# Patient Record
Sex: MALE | Race: BLACK OR AFRICAN AMERICAN | NOT HISPANIC OR LATINO | Employment: OTHER | URBAN - METROPOLITAN AREA
[De-identification: names, ages, dates, MRNs, and addresses within clinical notes are randomized per-mention and may not be internally consistent; named-entity substitution may affect disease eponyms.]

---

## 2021-10-12 LAB
RAPID GROUP A STREP (OHS): POSITIVE
SARS-COV-2 RNA RESP QL NAA+PROBE: NEGATIVE

## 2021-11-20 LAB — RAPID GROUP A STREP (OHS): NEGATIVE

## 2022-04-07 ENCOUNTER — HISTORICAL (OUTPATIENT)
Dept: ADMINISTRATIVE | Facility: HOSPITAL | Age: 42
End: 2022-04-07

## 2022-04-24 VITALS
HEIGHT: 72 IN | OXYGEN SATURATION: 100 % | DIASTOLIC BLOOD PRESSURE: 119 MMHG | WEIGHT: 229.25 LBS | BODY MASS INDEX: 31.05 KG/M2 | SYSTOLIC BLOOD PRESSURE: 175 MMHG

## 2022-09-08 ENCOUNTER — OFFICE VISIT (OUTPATIENT)
Dept: URGENT CARE | Facility: CLINIC | Age: 42
End: 2022-09-08
Payer: COMMERCIAL

## 2022-09-08 VITALS
DIASTOLIC BLOOD PRESSURE: 106 MMHG | SYSTOLIC BLOOD PRESSURE: 185 MMHG | BODY MASS INDEX: 32.21 KG/M2 | TEMPERATURE: 99 F | OXYGEN SATURATION: 100 % | RESPIRATION RATE: 18 BRPM | HEIGHT: 70 IN | HEART RATE: 101 BPM | WEIGHT: 225 LBS

## 2022-09-08 DIAGNOSIS — J02.9 ACUTE PHARYNGITIS, UNSPECIFIED ETIOLOGY: ICD-10-CM

## 2022-09-08 DIAGNOSIS — J02.9 SORE THROAT: Primary | ICD-10-CM

## 2022-09-08 LAB
CTP QC/QA: YES
S PYO RRNA THROAT QL PROBE: NEGATIVE

## 2022-09-08 PROCEDURE — 99213 OFFICE O/P EST LOW 20 MIN: CPT | Mod: 25,,, | Performed by: PHYSICIAN ASSISTANT

## 2022-09-08 PROCEDURE — 87880 POCT RAPID STREP A: ICD-10-PCS | Mod: QW,,, | Performed by: PHYSICIAN ASSISTANT

## 2022-09-08 PROCEDURE — 87880 STREP A ASSAY W/OPTIC: CPT | Mod: QW,,, | Performed by: PHYSICIAN ASSISTANT

## 2022-09-08 PROCEDURE — 99213 PR OFFICE/OUTPT VISIT, EST, LEVL III, 20-29 MIN: ICD-10-PCS | Mod: 25,,, | Performed by: PHYSICIAN ASSISTANT

## 2022-09-08 RX ORDER — FERROUS SULFATE 325(65) MG
325 TABLET ORAL
COMMUNITY
Start: 2021-10-12

## 2022-09-08 RX ORDER — ALLOPURINOL 300 MG/1
300 TABLET ORAL DAILY PRN
COMMUNITY
Start: 2022-04-10

## 2022-09-08 RX ORDER — AMOXICILLIN 875 MG/1
875 TABLET, FILM COATED ORAL 2 TIMES DAILY
Qty: 20 TABLET | Refills: 0 | Status: SHIPPED | OUTPATIENT
Start: 2022-09-08 | End: 2022-09-18

## 2022-09-08 RX ORDER — MELOXICAM 15 MG/1
15 TABLET ORAL DAILY
COMMUNITY
Start: 2022-07-29

## 2022-09-08 RX ORDER — ROSUVASTATIN CALCIUM 20 MG/1
20 TABLET, COATED ORAL
COMMUNITY
Start: 2021-10-12

## 2022-09-08 RX ORDER — EZETIMIBE 10 MG/1
10 TABLET ORAL
COMMUNITY
Start: 2021-10-12

## 2022-09-08 RX ORDER — TADALAFIL 20 MG/1
20 TABLET ORAL DAILY PRN
COMMUNITY
Start: 2022-08-28

## 2022-09-08 NOTE — PROGRESS NOTES
"Subjective:       Patient ID: Ignacio Bloom III is a 41 y.o. male.    Vitals:  height is 5' 10" (1.778 m) and weight is 102.1 kg (225 lb). His oral temperature is 98.6 °F (37 °C). His blood pressure is 185/106 (abnormal) and his pulse is 101. His respiration is 18 and oxygen saturation is 100%.     Chief Complaint: Sore Throat    Sore throat x2-3 days. Congestion x2 weeks.  Denies any fever neck stiffness rash cough shortness of breath GI symptoms.  ROS    Objective:      Physical Exam   Constitutional: He is oriented to person, place, and time. He appears well-developed. He is cooperative.  Non-toxic appearance. He does not appear ill. No distress.   HENT:   Head: Normocephalic and atraumatic.   Ears:   Right Ear: Hearing, tympanic membrane, external ear and ear canal normal.   Left Ear: Hearing, tympanic membrane, external ear and ear canal normal.   Nose: Nose normal. No mucosal edema, rhinorrhea or nasal deformity. No epistaxis. Right sinus exhibits no maxillary sinus tenderness and no frontal sinus tenderness. Left sinus exhibits no maxillary sinus tenderness and no frontal sinus tenderness.   Mouth/Throat: Uvula is midline and mucous membranes are normal. No trismus in the jaw. Normal dentition. No uvula swelling. Posterior oropharyngeal erythema present. No oropharyngeal exudate or posterior oropharyngeal edema.   Eyes: Conjunctivae and lids are normal. No scleral icterus.   Neck: Trachea normal and phonation normal. Neck supple. No edema present. No erythema present. No neck rigidity present.   Cardiovascular: Normal rate, regular rhythm, normal heart sounds and normal pulses.   Pulmonary/Chest: Effort normal and breath sounds normal. No respiratory distress. He has no decreased breath sounds. He has no rhonchi.   Abdominal: Normal appearance.   Musculoskeletal: Normal range of motion.         General: No deformity. Normal range of motion.   Lymphadenopathy:     He has no cervical adenopathy.   Neurological: " "He is alert and oriented to person, place, and time. He exhibits normal muscle tone. Coordination normal.   Skin: Skin is warm, dry, intact, not diaphoretic and not pale.   Psychiatric: His speech is normal and behavior is normal. Judgment and thought content normal.   Nursing note and vitals reviewed.             Previous History      Review of patient's allergies indicates:  No Known Allergies    Past Medical History:   Diagnosis Date    Anemia     Hyperlipidemia      Current Outpatient Medications   Medication Instructions    allopurinoL (ZYLOPRIM) 300 mg, Oral, Daily PRN    amoxicillin (AMOXIL) 875 mg, Oral, 2 times daily    ezetimibe (ZETIA) 10 mg, Oral    ferrous sulfate (FEOSOL) 325 mg, Oral    meloxicam (MOBIC) 15 mg, Oral, Daily    rosuvastatin (CRESTOR) 20 mg, Oral    tadalafiL (CIALIS) 20 mg, Oral, Daily PRN     History reviewed. No pertinent surgical history.  Family History   Problem Relation Age of Onset    No Known Problems Mother     Heart attack Father        Social History     Tobacco Use    Smoking status: Never     Passive exposure: Never    Smokeless tobacco: Never   Substance Use Topics    Alcohol use: Yes    Drug use: Never        Physical Exam      Vital Signs Reviewed   BP (!) 185/106   Pulse 101   Temp 98.6 °F (37 °C) (Oral)   Resp 18   Ht 5' 10" (1.778 m)   Wt 102.1 kg (225 lb)   SpO2 100%   BMI 32.28 kg/m²        Procedures    Procedures     Labs     Results for orders placed or performed in visit on 09/08/22   POCT rapid strep A   Result Value Ref Range    Rapid Strep A Screen Negative Negative     Acceptable Yes      Assessment:       1. Sore throat    2. Acute pharyngitis, unspecified etiology          Plan:         Sore throat  -     POCT rapid strep A    Acute pharyngitis, unspecified etiology  -     amoxicillin (AMOXIL) 875 MG tablet; Take 1 tablet (875 mg total) by mouth 2 (two) times daily. for 10 days  Dispense: 20 tablet; Refill: 0       Drink plenty of " fluids    Get plenty of rest.    Follow-up with your primary care doctor      Go to emergency department with any significant change or worsening symptoms.    Tylenol or Motrin as needed for fever.  Please monitor your blood pressure and follow-up with your regular doctor.

## 2022-09-08 NOTE — PATIENT INSTRUCTIONS
Drink plenty of fluids    Get plenty of rest.    Follow-up with your primary care doctor      Go to emergency department with any significant change or worsening symptoms.    Tylenol or Motrin as needed for fever.  Please monitor your blood pressure and follow-up with your regular doctor.

## 2022-09-15 ENCOUNTER — HISTORICAL (OUTPATIENT)
Dept: ADMINISTRATIVE | Facility: HOSPITAL | Age: 42
End: 2022-09-15
Payer: COMMERCIAL

## 2023-07-06 ENCOUNTER — OFFICE VISIT (OUTPATIENT)
Dept: URGENT CARE | Facility: CLINIC | Age: 43
End: 2023-07-06
Payer: COMMERCIAL

## 2023-07-06 VITALS
WEIGHT: 225 LBS | DIASTOLIC BLOOD PRESSURE: 99 MMHG | SYSTOLIC BLOOD PRESSURE: 150 MMHG | RESPIRATION RATE: 18 BRPM | OXYGEN SATURATION: 100 % | BODY MASS INDEX: 32.21 KG/M2 | TEMPERATURE: 99 F | HEART RATE: 106 BPM | HEIGHT: 70 IN

## 2023-07-06 DIAGNOSIS — M10.9 GOUT, UNSPECIFIED CAUSE, UNSPECIFIED CHRONICITY, UNSPECIFIED SITE: Primary | ICD-10-CM

## 2023-07-06 PROCEDURE — 96372 PR INJECTION,THERAP/PROPH/DIAG2ST, IM OR SUBCUT: ICD-10-PCS | Mod: ,,, | Performed by: FAMILY MEDICINE

## 2023-07-06 PROCEDURE — 96372 THER/PROPH/DIAG INJ SC/IM: CPT | Mod: ,,, | Performed by: FAMILY MEDICINE

## 2023-07-06 PROCEDURE — 99213 OFFICE O/P EST LOW 20 MIN: CPT | Mod: 25,,, | Performed by: FAMILY MEDICINE

## 2023-07-06 PROCEDURE — 99213 PR OFFICE/OUTPT VISIT, EST, LEVL III, 20-29 MIN: ICD-10-PCS | Mod: 25,,, | Performed by: FAMILY MEDICINE

## 2023-07-06 RX ORDER — HYDROCODONE BITARTRATE AND ACETAMINOPHEN 5; 325 MG/1; MG/1
1 TABLET ORAL EVERY 6 HOURS PRN
Qty: 16 TABLET | Refills: 0 | Status: SHIPPED | OUTPATIENT
Start: 2023-07-06 | End: 2023-07-10

## 2023-07-06 RX ORDER — PREDNISONE 20 MG/1
TABLET ORAL
Qty: 15 TABLET | Refills: 0 | Status: SHIPPED | OUTPATIENT
Start: 2023-07-06

## 2023-07-06 RX ORDER — DEXAMETHASONE SODIUM PHOSPHATE 100 MG/10ML
10 INJECTION INTRAMUSCULAR; INTRAVENOUS
Status: COMPLETED | OUTPATIENT
Start: 2023-07-06 | End: 2023-07-06

## 2023-07-06 RX ORDER — KETOROLAC TROMETHAMINE 30 MG/ML
30 INJECTION, SOLUTION INTRAMUSCULAR; INTRAVENOUS
Status: COMPLETED | OUTPATIENT
Start: 2023-07-06 | End: 2023-07-06

## 2023-07-06 RX ADMIN — DEXAMETHASONE SODIUM PHOSPHATE 10 MG: 100 INJECTION INTRAMUSCULAR; INTRAVENOUS at 09:07

## 2023-07-06 RX ADMIN — KETOROLAC TROMETHAMINE 30 MG: 30 INJECTION, SOLUTION INTRAMUSCULAR; INTRAVENOUS at 09:07

## 2023-07-06 NOTE — PROGRESS NOTES
"Subjective:      Patient ID: Ignacio Bloom III is a 42 y.o. male.    Vitals:  height is 5' 10" (1.778 m) and weight is 102.1 kg (225 lb). His temperature is 98.5 °F (36.9 °C). His blood pressure is 150/99 (abnormal) and his pulse is 106. His respiration is 18 and oxygen saturation is 100%.     Chief Complaint: Gout (Gout left foot x4d no medication taken /Denies any past injury )     Patient is a 42 y.o. male who presents to urgent care with complaints of Gout left foot, great toe x4 days. No medication taken. Patient denies any  injury.  Patient has a history of gout.  States he did have alcohol and some red meat prior to this flare-up.      Constitution: Negative.   HENT: Negative.     Neck: neck negative.   Cardiovascular: Negative.    Eyes: Negative.    Respiratory: Negative.     Gastrointestinal: Negative.    Genitourinary: Negative.    Musculoskeletal:  Positive for joint pain.   Skin: Negative.    Allergic/Immunologic: Negative.    Neurological: Negative.    Hematologic/Lymphatic: Negative.     Objective:     Physical Exam   Constitutional: He is oriented to person, place, and time.  Non-toxic appearance. He does not appear ill. No distress.   HENT:   Head: Normocephalic and atraumatic.   Eyes: Conjunctivae are normal.   Pulmonary/Chest: Effort normal.   Abdominal: Normal appearance.   Musculoskeletal:         General: Tenderness (left great toe) present.   Neurological: He is alert and oriented to person, place, and time.   Skin: Skin is not diaphoretic.   Psychiatric: His behavior is normal. Mood, judgment and thought content normal.   Vitals reviewed.       Previous History      Review of patient's allergies indicates:  No Known Allergies    Past Medical History:   Diagnosis Date    Anemia     Hyperlipidemia      Current Outpatient Medications   Medication Instructions    allopurinoL (ZYLOPRIM) 300 mg, Oral, Daily PRN    ezetimibe (ZETIA) 10 mg, Oral    ferrous sulfate (FEOSOL) 325 mg, Oral    " "HYDROcodone-acetaminophen (NORCO) 5-325 mg per tablet 1 tablet, Oral, Every 6 hours PRN    meloxicam (MOBIC) 15 mg, Oral, Daily    predniSONE (DELTASONE) 20 MG tablet Take 2 tabs p.o. q.d. x5 days then 1 tab p.o. q.d. x5 days    rosuvastatin (CRESTOR) 20 mg, Oral    tadalafiL (CIALIS) 20 mg, Oral, Daily PRN     No past surgical history on file.  Family History   Problem Relation Age of Onset    No Known Problems Mother     Heart attack Father        Social History     Tobacco Use    Smoking status: Never     Passive exposure: Never    Smokeless tobacco: Never   Substance Use Topics    Alcohol use: Yes    Drug use: Never        Physical Exam      Vital Signs Reviewed   BP (!) 150/99   Pulse 106   Temp 98.5 °F (36.9 °C)   Resp 18   Ht 5' 10" (1.778 m)   Wt 102.1 kg (225 lb)   SpO2 100%   BMI 32.28 kg/m²        Procedures    Procedures     Labs     Results for orders placed or performed in visit on 09/15/22   POCT rapid strep A   Result Value Ref Range    Rapid Group A Strep Negative        Assessment:     1. Gout, unspecified cause, unspecified chronicity, unspecified site        Plan:   Medications sent to pharmacy  Start the oral steroids tomorrow morning.  Continue steroids until the pain has resolved then you can stop taking the steroids.  Pain medication may cause drowsiness.  Do not drink alcohol or drive when taking it.  Avoid your triggers such as alcohol and red meat.  Contact this clinic with any concerns  Gout, unspecified cause, unspecified chronicity, unspecified site    Other orders  -     dexAMETHasone injection 10 mg  -     ketorolac injection 30 mg  -     predniSONE (DELTASONE) 20 MG tablet; Take 2 tabs p.o. q.d. x5 days then 1 tab p.o. q.d. x5 days  Dispense: 15 tablet; Refill: 0  -     HYDROcodone-acetaminophen (NORCO) 5-325 mg per tablet; Take 1 tablet by mouth every 6 (six) hours as needed for Pain.  Dispense: 16 tablet; Refill: 0                    "

## 2023-07-06 NOTE — PATIENT INSTRUCTIONS
Medications sent to pharmacy  Start the oral steroids tomorrow morning.  Continue steroids until the pain has resolved then you can stop taking the steroids.  Pain medication may cause drowsiness.  Do not drink alcohol or drive when taking it.  Avoid your triggers such as alcohol and red meat.  Contact this clinic with any concerns

## 2023-11-24 ENCOUNTER — TELEPHONE (OUTPATIENT)
Dept: URGENT CARE | Facility: CLINIC | Age: 43
End: 2023-11-24

## 2023-11-24 ENCOUNTER — OFFICE VISIT (OUTPATIENT)
Dept: URGENT CARE | Facility: CLINIC | Age: 43
End: 2023-11-24
Payer: COMMERCIAL

## 2023-11-24 VITALS
HEART RATE: 90 BPM | BODY MASS INDEX: 30.8 KG/M2 | RESPIRATION RATE: 20 BRPM | WEIGHT: 220 LBS | HEIGHT: 71 IN | OXYGEN SATURATION: 99 % | SYSTOLIC BLOOD PRESSURE: 158 MMHG | DIASTOLIC BLOOD PRESSURE: 90 MMHG | TEMPERATURE: 99 F

## 2023-11-24 DIAGNOSIS — J01.90 ACUTE SINUSITIS, RECURRENCE NOT SPECIFIED, UNSPECIFIED LOCATION: ICD-10-CM

## 2023-11-24 DIAGNOSIS — R05.9 COUGH, UNSPECIFIED TYPE: Primary | ICD-10-CM

## 2023-11-24 PROCEDURE — 99213 PR OFFICE/OUTPT VISIT, EST, LEVL III, 20-29 MIN: ICD-10-PCS | Mod: ,,, | Performed by: PHYSICIAN ASSISTANT

## 2023-11-24 PROCEDURE — 99213 OFFICE O/P EST LOW 20 MIN: CPT | Mod: ,,, | Performed by: PHYSICIAN ASSISTANT

## 2023-11-24 RX ORDER — PREDNISONE 20 MG/1
20 TABLET ORAL 2 TIMES DAILY
Qty: 10 TABLET | Refills: 0 | Status: SHIPPED | OUTPATIENT
Start: 2023-11-24 | End: 2023-11-29

## 2023-11-24 RX ORDER — AMOXICILLIN AND CLAVULANATE POTASSIUM 875; 125 MG/1; MG/1
1 TABLET, FILM COATED ORAL EVERY 12 HOURS
Qty: 20 TABLET | Refills: 0 | Status: SHIPPED | OUTPATIENT
Start: 2023-11-24 | End: 2023-12-04

## 2023-11-24 NOTE — PROGRESS NOTES
"Subjective:      Patient ID: Ignacoi Bloom III is a 43 y.o. male.    Vitals:  height is 5' 11" (1.803 m) and weight is 99.8 kg (220 lb). His oral temperature is 99.1 °F (37.3 °C). His blood pressure is 158/90 (abnormal) and his pulse is 90. His respiration is 20 and oxygen saturation is 99%.     Chief Complaint: Epistaxis (Pt reports to clinic with c/o of nose bleed and coughing up blood for about a week. He states he been having wheezing issues. )    The patient is a 43-year-old male complains of a 1-2 week history of nasal congestion, productive cough, occasional epistaxis, and occasional bloody productive cough.  Denies fever or shortness of breath.  He does report using a vape frequently.      ROS   Objective:     Physical Exam   Constitutional: He is oriented to person, place, and time. He appears well-developed. He is cooperative.  Non-toxic appearance. He does not appear ill. No distress.   HENT:   Head: Normocephalic and atraumatic.   Ears:   Right Ear: Hearing, tympanic membrane, external ear and ear canal normal.   Left Ear: Hearing, tympanic membrane, external ear and ear canal normal.   Nose: No nasal deformity. No epistaxis.   Mouth/Throat: Uvula is midline, oropharynx is clear and moist and mucous membranes are normal. No trismus in the jaw. Normal dentition. No uvula swelling. No oropharyngeal exudate, posterior oropharyngeal edema or posterior oropharyngeal erythema.   Eyes: Conjunctivae and lids are normal. No scleral icterus.   Neck: Trachea normal and phonation normal. Neck supple. No edema present. No erythema present. No neck rigidity present.   Cardiovascular: Normal rate, regular rhythm, normal heart sounds and normal pulses.   Pulmonary/Chest: Effort normal and breath sounds normal. No respiratory distress. He has no decreased breath sounds. He has no rhonchi.   Abdominal: Normal appearance.   Musculoskeletal: Normal range of motion.         General: No deformity. Normal range of motion. "   Neurological: He is alert and oriented to person, place, and time. He exhibits normal muscle tone. Coordination normal.   Skin: Skin is warm, dry, intact, not diaphoretic and not pale.   Psychiatric: His speech is normal and behavior is normal. Judgment and thought content normal.   Nursing note and vitals reviewed.  Dried blood present in both nostrils.  Mucous membranes are mildly swollen and friable.      Chest XR: no observed acute findings. Awaiting radiology over read.      Assessment:     1. Cough, unspecified type    2. Acute sinusitis, recurrence not specified, unspecified location        Plan:       Cough, unspecified type  -     X-Ray Chest PA And Lateral; Future; Expected date: 11/24/2023    Acute sinusitis, recurrence not specified, unspecified location    Other orders  -     predniSONE (DELTASONE) 20 MG tablet; Take 1 tablet (20 mg total) by mouth 2 (two) times daily. for 5 days  Dispense: 10 tablet; Refill: 0  -     amoxicillin-clavulanate 875-125mg (AUGMENTIN) 875-125 mg per tablet; Take 1 tablet by mouth every 12 (twelve) hours. for 10 days  Dispense: 20 tablet; Refill: 0      Drink plenty of fluids.     Get plenty of rest.     Tylenol or Motrin as needed.     Go to the ER with any significant change or worsening of symptoms.     Follow up with your primary care doctor.

## 2024-04-24 ENCOUNTER — OFFICE VISIT (OUTPATIENT)
Dept: URGENT CARE | Facility: CLINIC | Age: 44
End: 2024-04-24
Payer: COMMERCIAL

## 2024-04-24 VITALS
BODY MASS INDEX: 28 KG/M2 | HEIGHT: 71 IN | OXYGEN SATURATION: 98 % | SYSTOLIC BLOOD PRESSURE: 143 MMHG | TEMPERATURE: 99 F | WEIGHT: 200 LBS | HEART RATE: 98 BPM | DIASTOLIC BLOOD PRESSURE: 103 MMHG | RESPIRATION RATE: 17 BRPM

## 2024-04-24 DIAGNOSIS — R19.7 DIARRHEA, UNSPECIFIED TYPE: ICD-10-CM

## 2024-04-24 DIAGNOSIS — K21.9 GASTROESOPHAGEAL REFLUX DISEASE, UNSPECIFIED WHETHER ESOPHAGITIS PRESENT: Primary | ICD-10-CM

## 2024-04-24 PROCEDURE — 99213 OFFICE O/P EST LOW 20 MIN: CPT | Mod: ,,,

## 2024-04-24 RX ORDER — PANTOPRAZOLE SODIUM 40 MG/1
40 TABLET, DELAYED RELEASE ORAL DAILY
Qty: 90 TABLET | Refills: 3 | Status: SHIPPED | OUTPATIENT
Start: 2024-04-24 | End: 2025-04-24

## 2024-04-24 RX ORDER — ALUMINUM HYDROXIDE, MAGNESIUM HYDROXIDE, AND SIMETHICONE 1200; 120; 1200 MG/30ML; MG/30ML; MG/30ML
30 SUSPENSION ORAL
Status: DISCONTINUED | OUTPATIENT
Start: 2024-04-24 | End: 2024-04-24

## 2024-04-24 RX ORDER — ALUMINUM HYDROXIDE, MAGNESIUM HYDROXIDE, AND SIMETHICONE 1200; 120; 1200 MG/30ML; MG/30ML; MG/30ML
30 SUSPENSION ORAL
Status: COMPLETED | OUTPATIENT
Start: 2024-04-24 | End: 2024-04-24

## 2024-04-24 RX ORDER — LIDOCAINE HYDROCHLORIDE 20 MG/ML
15 SOLUTION OROPHARYNGEAL
Status: DISCONTINUED | OUTPATIENT
Start: 2024-04-24 | End: 2024-04-24

## 2024-04-24 RX ORDER — LIDOCAINE HYDROCHLORIDE 20 MG/ML
15 SOLUTION OROPHARYNGEAL
Status: COMPLETED | OUTPATIENT
Start: 2024-04-24 | End: 2024-04-24

## 2024-04-24 RX ADMIN — ALUMINUM HYDROXIDE, MAGNESIUM HYDROXIDE, AND SIMETHICONE 30 ML: 1200; 120; 1200 SUSPENSION ORAL at 11:04

## 2024-04-24 RX ADMIN — LIDOCAINE HYDROCHLORIDE 15 ML: 20 SOLUTION OROPHARYNGEAL at 11:04

## 2024-04-24 NOTE — PATIENT INSTRUCTIONS
Suspect that you may have developed a stomach virus. Over-the-counter Imodium as needed for diarrhea.  Drink plenty of fluids and rest.     Highly likely that you also have acid reflux.  We will start you on acid reflux medication.  Remember to avoid foods that are acidic or fried.  Try to sit up at least 30 minutes to an hour after eating.     As discussed, go to ER with any severe abdominal pain, chest pain, other worrisome symptoms.

## 2024-04-24 NOTE — PROGRESS NOTES
"Subjective:      Patient ID: Ignacio Bloom III is a 43 y.o. male.    Vitals:  height is 5' 11" (1.803 m) and weight is 90.7 kg (200 lb). His temperature is 98.7 °F (37.1 °C). His blood pressure is 143/103 (abnormal) and his pulse is 98. His respiration is 17 and oxygen saturation is 98%.     Chief Complaint: Diarrhea    Patient is a 43 y.o. male who presents to urgent care with complaints of diarrhea, nausea, body aches, possible fever due to chills/sweats x 3 days.  He denies any severe abdominal pain, vomiting, cough, congestion, chest pain or shortness of breath.  He has been able to hold down fluids and eat.     Patient states he took ibuprofen for his body aches and since then has had indigestion feeling like something is burning and needs to come up in throat/upper mid chest.  States this has happened in the past when he was taking Tylenol or ibuprofen.  It also happens every time he eats something like spaghetti.         Diarrhea   Associated symptoms include chills. Pertinent negatives include no abdominal pain or vomiting.     Constitution: Positive for chills and sweating.   Gastrointestinal:  Positive for diarrhea and heartburn. Negative for abdominal pain, nausea, vomiting and constipation.      Objective:     Physical Exam   Constitutional: He is oriented to person, place, and time.  Non-toxic appearance. He does not appear ill.   HENT:   Ears:   Right Ear: Tympanic membrane normal.   Left Ear: Tympanic membrane normal.   Nose: Nose normal. No rhinorrhea or congestion.   Mouth/Throat: Mucous membranes are moist. No posterior oropharyngeal erythema. Oropharynx is clear.   Eyes: Conjunctivae are normal.   Neck: Neck supple. No neck rigidity present.   Cardiovascular: Normal rate and normal pulses.   Pulmonary/Chest: Effort normal.   Abdominal: Bowel sounds are normal. Soft. There is no abdominal tenderness. There is no guarding, no left CVA tenderness and no right CVA tenderness.   Neurological: He is alert " and oriented to person, place, and time.   Skin: Skin is warm, not diaphoretic and no rash.   Psychiatric: His behavior is normal. Mood normal.   Nursing note and vitals reviewed.      Assessment:     1. Gastroesophageal reflux disease, unspecified whether esophagitis present    2. Diarrhea, unspecified type        Plan:     Patient given a GI cocktail while still in clinic.  Waited 15 minutes and went talk to patient and he stated he felt much better and did no longer feel like something needed to come up in his throat.     Gastroesophageal reflux disease, unspecified whether esophagitis present  -     pantoprazole (PROTONIX) 40 MG tablet; Take 1 tablet (40 mg total) by mouth once daily.  Dispense: 90 tablet; Refill: 3  -     Discontinue: LIDOcaine viscous HCl 2% oral solution 15 mL  -     Discontinue: aluminum-magnesium hydroxide-simethicone 200-200-20 mg/5 mL suspension 30 mL  -     aluminum-magnesium hydroxide-simethicone 200-200-20 mg/5 mL suspension 30 mL  -     LIDOcaine viscous HCl 2% oral solution 15 mL    Diarrhea, unspecified type    Suspect that you may have developed a stomach virus. Over-the-counter Imodium as needed for diarrhea.     Highly likely that you also have acid reflux.  We will start you on acid reflux medication.  Remember to avoid foods that are acidic or fried.  Try to sit up at least 30 minutes to an hour after eating.     As discussed, go to ER with any severe abdominal pain, chest pain, other worrisome symptoms.

## 2024-09-05 ENCOUNTER — OFFICE VISIT (OUTPATIENT)
Dept: URGENT CARE | Facility: CLINIC | Age: 44
End: 2024-09-05
Payer: COMMERCIAL

## 2024-09-05 VITALS
BODY MASS INDEX: 30.8 KG/M2 | RESPIRATION RATE: 18 BRPM | WEIGHT: 220 LBS | HEIGHT: 71 IN | OXYGEN SATURATION: 98 % | DIASTOLIC BLOOD PRESSURE: 92 MMHG | HEART RATE: 115 BPM | SYSTOLIC BLOOD PRESSURE: 160 MMHG | TEMPERATURE: 99 F

## 2024-09-05 DIAGNOSIS — M79.661 RIGHT CALF PAIN: Primary | ICD-10-CM

## 2024-09-05 DIAGNOSIS — M79.604 PAIN AND SWELLING OF LOWER EXTREMITY, RIGHT: ICD-10-CM

## 2024-09-05 DIAGNOSIS — M79.89 PAIN AND SWELLING OF LOWER EXTREMITY, RIGHT: ICD-10-CM

## 2024-09-05 RX ORDER — HYDROCODONE BITARTRATE AND ACETAMINOPHEN 5; 325 MG/1; MG/1
1 TABLET ORAL EVERY 6 HOURS PRN
Qty: 12 TABLET | Refills: 0 | Status: SHIPPED | OUTPATIENT
Start: 2024-09-05 | End: 2024-09-08

## 2024-09-05 RX ORDER — IRBESARTAN 75 MG/1
75 TABLET ORAL
COMMUNITY

## 2024-09-05 NOTE — PROGRESS NOTES
"Subjective:      Patient ID: Ignacio Bloom III is a 43 y.o. male.    Vitals:  height is 5' 11" (1.803 m) and weight is 99.8 kg (220 lb). His temperature is 99.1 °F (37.3 °C). His blood pressure is 160/92 (abnormal) and his pulse is 115 (abnormal). His respiration is 18 and oxygen saturation is 98%.     Chief Complaint: Injury     Patient is a 43 y.o. male who presents to urgent care with complaints of right calf pain from getting hit with a softball in leg.  States the softball was thrown.  States he was able to weight bear and ambulate yesterday but today the pain is much worse.  Unable to weight bear or ambulate.  Denies any numbness or tingling in the foot.  States the pain is from the ankle to above the knee on the right side.      Injury        Constitution: Negative.   HENT: Negative.     Neck: neck negative.   Cardiovascular:  Positive for leg swelling.   Eyes: Negative.    Respiratory: Negative.     Gastrointestinal: Negative.    Genitourinary: Negative.    Musculoskeletal: Negative.  Positive for pain.   Skin: Negative.    Allergic/Immunologic: Negative.    Neurological: Negative.    Hematologic/Lymphatic: Negative.       Objective:     Physical Exam   Constitutional: He is oriented to person, place, and time.  Non-toxic appearance. He does not appear ill. No distress.   HENT:   Head: Normocephalic and atraumatic.   Eyes: Conjunctivae are normal.   Pulmonary/Chest: Effort normal.   Abdominal: Normal appearance.   Musculoskeletal:         General: Swelling (swelling of the right lower extremity mostly involving the calf.  Walter test negative.  Foot on the right is warm to touch.  Capillary refill less than 2 seconds.  Sensation intact. pedal pulses palpable) and tenderness (right lower extremity calf tenderness) present.   Neurological: He is alert and oriented to person, place, and time.   Skin: Skin is not diaphoretic and not pale. bruising (bruising just above the posterior knee on the right side) " "  Psychiatric: His behavior is normal. Mood, judgment and thought content normal.   Vitals reviewed.         Previous History      Review of patient's allergies indicates:  No Known Allergies    Past Medical History:   Diagnosis Date    Anemia     Hx of gout     Hyperlipidemia     Hypertension      Current Outpatient Medications   Medication Instructions    allopurinoL (ZYLOPRIM) 300 mg, Daily PRN    ezetimibe (ZETIA) 10 mg    ferrous sulfate (FEOSOL) 325 mg, Oral    irbesartan (AVAPRO) 75 mg, Oral    meloxicam (MOBIC) 15 mg, Daily    pantoprazole (PROTONIX) 40 mg, Oral, Daily    predniSONE (DELTASONE) 20 MG tablet Take 2 tabs p.o. q.d. x5 days then 1 tab p.o. q.d. x5 days    rosuvastatin (CRESTOR) 20 mg    tadalafiL (CIALIS) 20 mg, Daily PRN     Past Surgical History:   Procedure Laterality Date    none       Family History   Problem Relation Name Age of Onset    No Known Problems Mother      Heart attack Father         Social History     Tobacco Use    Smoking status: Former     Types: Cigarettes, Vaping with nicotine     Start date:      Quit date: 2022     Years since quittin.0     Passive exposure: Never    Smokeless tobacco: Never   Substance Use Topics    Alcohol use: Yes    Drug use: Never        Physical Exam      Vital Signs Reviewed   BP (!) 160/92   Pulse (!) 115   Temp 99.1 °F (37.3 °C)   Resp 18   Ht 5' 11" (1.803 m)   Wt 99.8 kg (220 lb)   SpO2 98%   BMI 30.68 kg/m²        Procedures    Procedures     Labs     Results for orders placed or performed in visit on 09/15/22   POCT rapid strep A   Result Value Ref Range    Rapid Group A Strep Negative        Assessment:     1. Right calf pain    2. Pain and swelling of lower extremity, right        Plan:   X-ray negative for fracture  We are sending you to [facility] to have an ultrasound done of your leg.  Address has been given to you.head over there right now to have that done. Pain medications sent to your pharmacy. " May cause drowsiness. Do not drink alcohol or drive when taking it.  Rest ice elevate the affected limb 4 times a day for 10-15 minutes.  No strenuous activities for one-week.  If your foot or leg becomes numb , cold to the touch or your pain worsens or your swelling worsens, go to the emergency department immediately    Right calf pain  -     XR TIBIA FIBULA 2 VIEW RIGHT; Future; Expected date: 09/05/2024  -      Lower Extremity Veins Right; Future; Expected date: 09/05/2024  -      Extremity Non Vascular Complete Right; Future; Expected date: 09/05/2024    Pain and swelling of lower extremity, right  -     US Lower Extremity Veins Right; Future; Expected date: 09/05/2024  -      Extremity Non Vascular Complete Right; Future; Expected date: 09/05/2024

## 2024-09-10 ENCOUNTER — OFFICE VISIT (OUTPATIENT)
Dept: URGENT CARE | Facility: CLINIC | Age: 44
End: 2024-09-10
Payer: COMMERCIAL

## 2024-09-10 VITALS
SYSTOLIC BLOOD PRESSURE: 143 MMHG | TEMPERATURE: 98 F | OXYGEN SATURATION: 100 % | WEIGHT: 220 LBS | DIASTOLIC BLOOD PRESSURE: 101 MMHG | HEIGHT: 71 IN | BODY MASS INDEX: 30.8 KG/M2 | RESPIRATION RATE: 20 BRPM | HEART RATE: 99 BPM

## 2024-09-10 DIAGNOSIS — Z87.898 HISTORY OF EPISTAXIS: ICD-10-CM

## 2024-09-10 DIAGNOSIS — M10.9 GOUT, UNSPECIFIED CAUSE, UNSPECIFIED CHRONICITY, UNSPECIFIED SITE: Primary | ICD-10-CM

## 2024-09-10 PROCEDURE — 96372 THER/PROPH/DIAG INJ SC/IM: CPT | Mod: ,,, | Performed by: FAMILY MEDICINE

## 2024-09-10 PROCEDURE — 99213 OFFICE O/P EST LOW 20 MIN: CPT | Mod: 25,,, | Performed by: FAMILY MEDICINE

## 2024-09-10 RX ORDER — DEXAMETHASONE SODIUM PHOSPHATE 10 MG/ML
10 INJECTION INTRAMUSCULAR; INTRAVENOUS
Status: COMPLETED | OUTPATIENT
Start: 2024-09-10 | End: 2024-09-10

## 2024-09-10 RX ORDER — PREDNISONE 20 MG/1
40 TABLET ORAL DAILY
Qty: 10 TABLET | Refills: 0 | Status: SHIPPED | OUTPATIENT
Start: 2024-09-10 | End: 2024-09-15

## 2024-09-10 RX ORDER — HYDROCODONE BITARTRATE AND ACETAMINOPHEN 5; 325 MG/1; MG/1
1 TABLET ORAL EVERY 6 HOURS PRN
Qty: 8 TABLET | Refills: 0 | Status: SHIPPED | OUTPATIENT
Start: 2024-09-10 | End: 2024-09-12

## 2024-09-10 RX ORDER — MUPIROCIN 20 MG/G
OINTMENT TOPICAL 2 TIMES DAILY
Qty: 1 EACH | Refills: 0 | Status: SHIPPED | OUTPATIENT
Start: 2024-09-10 | End: 2024-09-20

## 2024-09-10 RX ADMIN — DEXAMETHASONE SODIUM PHOSPHATE 10 MG: 10 INJECTION INTRAMUSCULAR; INTRAVENOUS at 10:09

## 2024-09-10 NOTE — PATIENT INSTRUCTIONS
Plan:   Medications sent to pharmacy  Start oral steroids tomorrow  Apply Bactroban to both nostrils twice a day using a Q-tip  If your nosebleeds continue recommend ENT referral  Avoid your gout triggers  Contact this clinic with any concerns

## 2024-09-10 NOTE — PROGRESS NOTES
"Subjective:      Patient ID: Ignacio Bloom III is a 43 y.o. male.    Vitals:  height is 5' 11" (1.803 m) and weight is 99.8 kg (220 lb). His tympanic temperature is 97.5 °F (36.4 °C). His blood pressure is 143/101 (abnormal) and his pulse is 99. His respiration is 20 and oxygen saturation is 100%.     Chief Complaint: Leg Swelling     Patient is a 43 y.o. male who presents to urgent care with complaints of gout flare-up of the right ankle.  States that is the typical location of his gout flare-up.  Patient was seen here several days ago for a leg injury.  Was hit in the back of the leg with a softball.  Had a negative venous Doppler to rule out DVT also had a soft tissue ultrasound showing a small hematoma in the calf.  States the swelling of the calf has gone down his pain has improved.  But now has a gout flare-up.  States he has been having alcohol and steak recently.  Also complaining of on and off nosebleeds.  I did offer ENT referral but he declined.      Constitution: Negative.   HENT: Negative.     Neck: neck negative.   Cardiovascular: Negative.    Eyes: Negative.    Respiratory: Negative.     Gastrointestinal: Negative.    Genitourinary: Negative.    Musculoskeletal:  Positive for joint pain and joint swelling.   Skin: Negative.    Allergic/Immunologic: Negative.    Neurological: Negative.    Hematologic/Lymphatic: Negative.       Objective:     Physical Exam   Constitutional: He is oriented to person, place, and time.  Non-toxic appearance. He does not appear ill. No distress.   HENT:   Head: Normocephalic and atraumatic.   Eyes: Conjunctivae are normal.   Pulmonary/Chest: Effort normal. No respiratory distress.   Abdominal: Normal appearance.   Musculoskeletal:         General: Tenderness (dorsum of the right foot with swelling) present.   Neurological: He is alert and oriented to person, place, and time.   Skin: Skin is not diaphoretic.   Psychiatric: His behavior is normal. Mood, judgment and thought " "content normal.   Vitals reviewed.       Previous History      Review of patient's allergies indicates:  No Known Allergies    Past Medical History:   Diagnosis Date    Anemia     Hx of gout     Hyperlipidemia     Hypertension      Current Outpatient Medications   Medication Instructions    allopurinoL (ZYLOPRIM) 300 mg, Daily PRN    ezetimibe (ZETIA) 10 mg    ferrous sulfate (FEOSOL) 325 mg, Oral    irbesartan (AVAPRO) 75 mg, Oral    meloxicam (MOBIC) 15 mg, Daily    mupirocin (BACTROBAN) 2 % ointment Topical (Top), 2 times daily    pantoprazole (PROTONIX) 40 mg, Oral, Daily    predniSONE (DELTASONE) 20 MG tablet Take 2 tabs p.o. q.d. x5 days then 1 tab p.o. q.d. x5 days    predniSONE (DELTASONE) 40 mg, Oral, Daily    rosuvastatin (CRESTOR) 20 mg    tadalafiL (CIALIS) 20 mg, Daily PRN     Past Surgical History:   Procedure Laterality Date    none       Family History   Problem Relation Name Age of Onset    No Known Problems Mother      Heart attack Father         Social History     Tobacco Use    Smoking status: Former     Types: Cigarettes, Vaping with nicotine     Start date:      Quit date: 2022     Years since quittin.0     Passive exposure: Never    Smokeless tobacco: Never   Substance Use Topics    Alcohol use: Yes    Drug use: Never        Physical Exam      Vital Signs Reviewed   BP (!) 143/101   Pulse 99   Temp 97.5 °F (36.4 °C) (Tympanic)   Resp 20   Ht 5' 11" (1.803 m)   Wt 99.8 kg (220 lb)   SpO2 100%   BMI 30.68 kg/m²        Procedures    Procedures     Labs     Results for orders placed or performed in visit on 09/15/22   POCT rapid strep A   Result Value Ref Range    Rapid Group A Strep Negative          Assessment:     1. Gout, unspecified cause, unspecified chronicity, unspecified site    2. History of epistaxis        Plan:   Medications sent to pharmacy  Start oral steroids tomorrow  Apply Bactroban to both nostrils twice a day using a Q-tip  If your nosebleeds continue " recommend ENT referral  Avoid your gout triggers  Contact this clinic with any concerns    Gout, unspecified cause, unspecified chronicity, unspecified site    History of epistaxis    Other orders  -     dexAMETHasone injection 10 mg  -     predniSONE (DELTASONE) 20 MG tablet; Take 2 tablets (40 mg total) by mouth once daily. for 5 days  Dispense: 10 tablet; Refill: 0  -     mupirocin (BACTROBAN) 2 % ointment; Apply topically 2 (two) times daily. for 10 days  Dispense: 1 each; Refill: 0

## 2024-09-19 ENCOUNTER — OFFICE VISIT (OUTPATIENT)
Dept: URGENT CARE | Facility: CLINIC | Age: 44
End: 2024-09-19
Payer: COMMERCIAL

## 2024-09-19 VITALS
SYSTOLIC BLOOD PRESSURE: 155 MMHG | HEIGHT: 71 IN | TEMPERATURE: 99 F | OXYGEN SATURATION: 99 % | RESPIRATION RATE: 18 BRPM | WEIGHT: 220 LBS | HEART RATE: 116 BPM | DIASTOLIC BLOOD PRESSURE: 116 MMHG | BODY MASS INDEX: 30.8 KG/M2

## 2024-09-19 DIAGNOSIS — M79.671 RIGHT FOOT PAIN: ICD-10-CM

## 2024-09-19 DIAGNOSIS — M25.561 ACUTE PAIN OF RIGHT KNEE: Primary | ICD-10-CM

## 2024-09-19 PROCEDURE — 99213 OFFICE O/P EST LOW 20 MIN: CPT | Mod: ,,, | Performed by: PHYSICIAN ASSISTANT

## 2024-09-19 RX ORDER — AMLODIPINE BESYLATE 10 MG/1
TABLET ORAL
COMMUNITY
Start: 2021-10-12

## 2024-09-19 RX ORDER — PHENTERMINE HYDROCHLORIDE 37.5 MG/1
37.5 TABLET ORAL
COMMUNITY
Start: 2021-10-12

## 2024-09-19 RX ORDER — METOPROLOL SUCCINATE 50 MG/1
50 TABLET, EXTENDED RELEASE ORAL
COMMUNITY
Start: 2021-10-12

## 2024-09-19 RX ORDER — COLCHICINE 0.6 MG/1
TABLET ORAL
Qty: 3 TABLET | Refills: 0 | Status: SHIPPED | OUTPATIENT
Start: 2024-09-19

## 2024-09-19 RX ORDER — PREDNISONE 20 MG/1
20 TABLET ORAL 2 TIMES DAILY
Qty: 10 TABLET | Refills: 0 | Status: SHIPPED | OUTPATIENT
Start: 2024-09-19 | End: 2024-09-24

## 2024-09-19 NOTE — PATIENT INSTRUCTIONS
Elevated BP-- Please follow up with your doctor next week as scheduled.     Drink plenty of fluids.     Get plenty of rest.     Go to the ER with any significant change or worsening of symptoms.     Follow up with your primary care doctor.

## 2024-11-19 ENCOUNTER — TELEPHONE (OUTPATIENT)
Dept: PHARMACY | Facility: CLINIC | Age: 44
End: 2024-11-19
Payer: COMMERCIAL

## 2024-11-19 NOTE — TELEPHONE ENCOUNTER
[facility]/Population Health Chart Review & Patient Outreach Details For Medication Adherence Project    Reason for Outreach Encounter: 3rd Party payor non-compliance report ([employer], [employer], C, etc)  2.  Patient Outreach Method: Reviewed Patient Chart  3.   Medication in question: irbesartan   LAST FILLED: 10/21/24 for 30 day supply  Hypertension Medications               amLODIPine (NORVASC) 10 MG tablet 1 tablet Orally Once a day for 30 days    irbesartan (AVAPRO) 75 MG tablet Take 75 mg by mouth.    metoprolol succinate (TOPROL-XL) 50 MG 24 hr tablet Take 50 mg by mouth.              4.  Reviewed and or Updates Made To: Patient Chart  5. Outreach Outcomes and/or actions taken: Patient filled medication and is on track to be adherent

## 2024-12-29 ENCOUNTER — TELEPHONE (OUTPATIENT)
Dept: PHARMACY | Facility: CLINIC | Age: 44
End: 2024-12-29
Payer: COMMERCIAL

## 2025-02-24 ENCOUNTER — OFFICE VISIT (OUTPATIENT)
Dept: URGENT CARE | Facility: CLINIC | Age: 45
End: 2025-02-24
Payer: COMMERCIAL

## 2025-02-24 ENCOUNTER — TELEPHONE (OUTPATIENT)
Dept: URGENT CARE | Facility: CLINIC | Age: 45
End: 2025-02-24

## 2025-02-24 VITALS
HEIGHT: 71 IN | HEART RATE: 102 BPM | DIASTOLIC BLOOD PRESSURE: 101 MMHG | BODY MASS INDEX: 30.8 KG/M2 | OXYGEN SATURATION: 99 % | WEIGHT: 220 LBS | RESPIRATION RATE: 20 BRPM | SYSTOLIC BLOOD PRESSURE: 150 MMHG | TEMPERATURE: 99 F

## 2025-02-24 DIAGNOSIS — M25.561 ACUTE PAIN OF RIGHT KNEE: Primary | ICD-10-CM

## 2025-02-24 PROCEDURE — 96372 THER/PROPH/DIAG INJ SC/IM: CPT | Mod: ,,, | Performed by: PHYSICIAN ASSISTANT

## 2025-02-24 PROCEDURE — 99213 OFFICE O/P EST LOW 20 MIN: CPT | Mod: 25,,, | Performed by: PHYSICIAN ASSISTANT

## 2025-02-24 RX ORDER — HYDROCODONE BITARTRATE AND ACETAMINOPHEN 7.5; 325 MG/1; MG/1
1 TABLET ORAL EVERY 6 HOURS PRN
Qty: 12 TABLET | Refills: 0 | Status: SHIPPED | OUTPATIENT
Start: 2025-02-24 | End: 2025-02-27

## 2025-02-24 RX ORDER — DEXAMETHASONE SODIUM PHOSPHATE 10 MG/ML
10 INJECTION INTRAMUSCULAR; INTRAVENOUS
Status: COMPLETED | OUTPATIENT
Start: 2025-02-24 | End: 2025-02-24

## 2025-02-24 RX ORDER — PREDNISONE 20 MG/1
20 TABLET ORAL 2 TIMES DAILY
Qty: 10 TABLET | Refills: 0 | Status: SHIPPED | OUTPATIENT
Start: 2025-02-24 | End: 2025-03-01

## 2025-02-24 RX ORDER — COLCHICINE 0.6 MG/1
0.6 TABLET ORAL DAILY
Qty: 30 TABLET | Refills: 11 | Status: SHIPPED | OUTPATIENT
Start: 2025-02-24 | End: 2026-02-24

## 2025-02-24 RX ADMIN — DEXAMETHASONE SODIUM PHOSPHATE 10 MG: 10 INJECTION INTRAMUSCULAR; INTRAVENOUS at 12:02

## 2025-02-24 NOTE — PROGRESS NOTES
"Subjective:      Patient ID: Andres Terrell III is a 44 y.o. male.    Vitals:  height is 5' 11" (1.803 m) and weight is 99.8 kg (220 lb). His tympanic temperature is 98.7 °F (37.1 °C). His blood pressure is 150/101 (abnormal) and his pulse is 102. His respiration is 20 and oxygen saturation is 99%.     Chief Complaint: Gout     Patient is a 44 y.o. male with a past medical history of gout who presents to urgent care with complaints of right knee swelling x4 days.  He denies any acute trauma.  Reports recent copious beer consumption.  Denies calf swelling or erythema.  Denies fever  Alleviating factors include N/A with no relief. Patient denies N/V/D hA bA.        Skin:  Negative for erythema.      Objective:     Physical Exam   Constitutional: He is oriented to person, place, and time. He appears well-developed.   HENT:   Head: Normocephalic and atraumatic. Head is without abrasion, without contusion and without laceration.   Ears:   Right Ear: External ear normal.   Left Ear: External ear normal.   Nose: Nose normal.   Mouth/Throat: Oropharynx is clear and moist and mucous membranes are normal.   Eyes: Conjunctivae, EOM and lids are normal.   Neck: Phonation normal. Neck supple.   Pulmonary/Chest: Effort normal. No respiratory distress.   Abdominal: Normal appearance.   Musculoskeletal:         General: Swelling and tenderness present.   Neurological: He is alert and oriented to person, place, and time.   Skin: Skin is warm, dry, intact and no rash. Capillary refill takes less than 2 seconds. No abrasion, No burn, No bruising, No erythema and No ecchymosis   Psychiatric: His speech is normal and behavior is normal. Judgment and thought content normal.   Nursing note and vitals reviewed.  Right lower extremity:  Positive for swelling to the anterior aspect of the knee.  Mild generalized tenderness in this area.  Mild warmth but no significant erythema.  Patient is able to fully extend the knee but complains of " "significant discomfort with flexion of the knee to 90°.  No appreciated calf swelling or erythema.  Posterior tib pulse 2 +.         Previous History      Review of patient's allergies indicates:  No Known Allergies    Past Medical History:   Diagnosis Date    Anemia     Hx of gout     Hyperlipidemia     Hypertension      Current Outpatient Medications   Medication Instructions    allopurinoL (ZYLOPRIM) 300 mg, Daily PRN    amLODIPine (NORVASC) 10 MG tablet 1 tablet Orally Once a day for 30 days    colchicine (COLCRYS) 0.6 mg tablet Take two tablets by mouth. Then take 1 tablet by mouth an hour later.    colchicine (COLCRYS) 0.6 mg, Oral, Daily    ezetimibe (ZETIA) 10 mg    ferrous sulfate (FEOSOL) 325 mg    HYDROcodone-acetaminophen (NORCO) 7.5-325 mg per tablet 1 tablet, Oral, Every 6 hours PRN    irbesartan (AVAPRO) 75 mg    meloxicam (MOBIC) 15 mg, Daily    metoprolol succinate (TOPROL-XL) 50 mg    pantoprazole (PROTONIX) 40 mg, Oral, Daily    phentermine (ADIPEX-P) 37.5 mg    predniSONE (DELTASONE) 20 MG tablet Take 2 tabs p.o. q.d. x5 days then 1 tab p.o. q.d. x5 days    predniSONE (DELTASONE) 20 mg, Oral, 2 times daily    rosuvastatin (CRESTOR) 20 mg    tadalafiL (CIALIS) 20 mg, Daily PRN     Past Surgical History:   Procedure Laterality Date    none       Family History   Problem Relation Name Age of Onset    No Known Problems Mother      Heart attack Father         Social History[1]     Physical Exam      Vital Signs Reviewed   BP (!) 150/101   Pulse 102   Temp 98.7 °F (37.1 °C) (Tympanic)   Resp 20   Ht 5' 11" (1.803 m)   Wt 99.8 kg (220 lb)   SpO2 99%   BMI 30.68 kg/m²        Procedures    Procedures     Labs     Results for orders placed or performed in visit on 09/15/22   POCT rapid strep A    Collection Time: 11/20/21 10:43 AM   Result Value Ref Range    Rapid Group A Strep Negative        Assessment:     1. Acute pain of right knee        Plan:       Acute pain of right knee    Other orders  -  "    dexAMETHasone injection 10 mg  -     predniSONE (DELTASONE) 20 MG tablet; Take 1 tablet (20 mg total) by mouth 2 (two) times daily. for 5 days  Dispense: 10 tablet; Refill: 0  -     colchicine (COLCRYS) 0.6 mg tablet; Take 1 tablet (0.6 mg total) by mouth once daily.  Dispense: 30 tablet; Refill: 11  -     HYDROcodone-acetaminophen (NORCO) 7.5-325 mg per tablet; Take 1 tablet by mouth every 6 (six) hours as needed for Pain.  Dispense: 12 tablet; Refill: 0      Start prednisone tomorrow.      Elevate extremity above the level of the heart while resting to avoid excessive swelling.     Get plenty of rest.    Follow-up with your PCP.    Go to the Emergency Department with any significant change or worsening symptoms.                       [1]   Social History  Tobacco Use    Smoking status: Former     Types: Cigarettes, Vaping with nicotine     Start date:      Quit date: 2022     Years since quittin.5     Passive exposure: Never    Smokeless tobacco: Never   Substance Use Topics    Alcohol use: Yes    Drug use: Never

## 2025-02-24 NOTE — PATIENT INSTRUCTIONS
Start prednisone tomorrow.      Elevate extremity above the level of the heart while resting to avoid excessive swelling.     Get plenty of rest.    Follow-up with your PCP.    Go to the Emergency Department with any significant change or worsening symptoms.

## 2025-02-25 NOTE — TELEPHONE ENCOUNTER
Spoke with patient about recommendations and patient will stop Crestor will taking gout medicine.